# Patient Record
Sex: FEMALE | Race: WHITE | NOT HISPANIC OR LATINO | Employment: OTHER | URBAN - METROPOLITAN AREA
[De-identification: names, ages, dates, MRNs, and addresses within clinical notes are randomized per-mention and may not be internally consistent; named-entity substitution may affect disease eponyms.]

---

## 2021-08-30 ENCOUNTER — APPOINTMENT (OUTPATIENT)
Dept: CT IMAGING | Facility: CLINIC | Age: 86
End: 2021-08-30
Attending: EMERGENCY MEDICINE
Payer: COMMERCIAL

## 2021-08-30 ENCOUNTER — HOSPITAL ENCOUNTER (EMERGENCY)
Facility: CLINIC | Age: 86
Discharge: HOME | End: 2021-08-30
Attending: EMERGENCY MEDICINE | Admitting: EMERGENCY MEDICINE
Payer: COMMERCIAL

## 2021-08-30 ENCOUNTER — APPOINTMENT (OUTPATIENT)
Dept: RADIOLOGY | Facility: CLINIC | Age: 86
End: 2021-08-30
Attending: EMERGENCY MEDICINE
Payer: COMMERCIAL

## 2021-08-30 VITALS
RESPIRATION RATE: 28 BRPM | HEART RATE: 67 BPM | OXYGEN SATURATION: 97 % | BODY MASS INDEX: 29 KG/M2 | DIASTOLIC BLOOD PRESSURE: 77 MMHG | SYSTOLIC BLOOD PRESSURE: 177 MMHG | HEIGHT: 62 IN | WEIGHT: 157.6 LBS

## 2021-08-30 DIAGNOSIS — R07.89 ATYPICAL CHEST PAIN: ICD-10-CM

## 2021-08-30 DIAGNOSIS — M54.9 ACUTE MIDLINE BACK PAIN, UNSPECIFIED BACK LOCATION: ICD-10-CM

## 2021-08-30 DIAGNOSIS — I48.92 ATRIAL FLUTTER, UNSPECIFIED TYPE (H): ICD-10-CM

## 2021-08-30 LAB
ALBUMIN SERPL-MCNC: 3.5 G/DL (ref 3.5–5)
ALP SERPL-CCNC: 77 U/L (ref 45–120)
ALT SERPL W P-5'-P-CCNC: 11 U/L (ref 0–45)
ANION GAP SERPL CALCULATED.3IONS-SCNC: 10 MMOL/L (ref 5–18)
ANION GAP SERPL CALCULATED.3IONS-SCNC: 7 MMOL/L (ref 5–18)
AST SERPL W P-5'-P-CCNC: 13 U/L (ref 0–40)
BASOPHILS # BLD AUTO: 0.1 10E3/UL (ref 0–0.2)
BASOPHILS NFR BLD AUTO: 1 %
BILIRUB SERPL-MCNC: 0.3 MG/DL (ref 0–1)
BNP SERPL-MCNC: 27 PG/ML (ref 0–167)
BUN SERPL-MCNC: 14 MG/DL (ref 8–28)
BUN SERPL-MCNC: 14 MG/DL (ref 8–28)
CALCIUM SERPL-MCNC: 8.9 MG/DL (ref 8.5–10.5)
CALCIUM SERPL-MCNC: 9 MG/DL (ref 8.5–10.5)
CHLORIDE BLD-SCNC: 108 MMOL/L (ref 98–107)
CHLORIDE BLD-SCNC: 108 MMOL/L (ref 98–107)
CO2 SERPL-SCNC: 25 MMOL/L (ref 22–31)
CO2 SERPL-SCNC: 27 MMOL/L (ref 22–31)
CREAT SERPL-MCNC: 1 MG/DL (ref 0.6–1.1)
CREAT SERPL-MCNC: 1.05 MG/DL (ref 0.6–1.1)
EOSINOPHIL # BLD AUTO: 0.1 10E3/UL (ref 0–0.7)
EOSINOPHIL NFR BLD AUTO: 1 %
ERYTHROCYTE [DISTWIDTH] IN BLOOD BY AUTOMATED COUNT: 13.3 % (ref 10–15)
GFR SERPL CREATININE-BSD FRML MDRD: 46 ML/MIN/1.73M2
GFR SERPL CREATININE-BSD FRML MDRD: 48 ML/MIN/1.73M2
GLUCOSE BLD-MCNC: 96 MG/DL (ref 70–125)
GLUCOSE BLD-MCNC: 97 MG/DL (ref 70–125)
HCT VFR BLD AUTO: 38.2 % (ref 35–47)
HGB BLD-MCNC: 12 G/DL (ref 11.7–15.7)
HOLD SPECIMEN: NORMAL
HOLD SPECIMEN: NORMAL
IMM GRANULOCYTES # BLD: 0 10E3/UL
IMM GRANULOCYTES NFR BLD: 0 %
INR PPP: 1.01 (ref 0.85–1.15)
LYMPHOCYTES # BLD AUTO: 5.5 10E3/UL (ref 0.8–5.3)
LYMPHOCYTES NFR BLD AUTO: 54 %
MCH RBC QN AUTO: 30.8 PG (ref 26.5–33)
MCHC RBC AUTO-ENTMCNC: 31.4 G/DL (ref 31.5–36.5)
MCV RBC AUTO: 98 FL (ref 78–100)
MONOCYTES # BLD AUTO: 0.8 10E3/UL (ref 0–1.3)
MONOCYTES NFR BLD AUTO: 8 %
NEUTROPHILS # BLD AUTO: 3.6 10E3/UL (ref 1.6–8.3)
NEUTROPHILS NFR BLD AUTO: 36 %
NRBC # BLD AUTO: 0 10E3/UL
NRBC BLD AUTO-RTO: 0 /100
PLATELET # BLD AUTO: 197 10E3/UL (ref 150–450)
POTASSIUM BLD-SCNC: 4.3 MMOL/L (ref 3.5–5)
POTASSIUM BLD-SCNC: 4.4 MMOL/L (ref 3.5–5)
PROT SERPL-MCNC: 7.1 G/DL (ref 6–8)
RBC # BLD AUTO: 3.9 10E6/UL (ref 3.8–5.2)
SARS-COV-2 RNA RESP QL NAA+PROBE: NEGATIVE
SODIUM SERPL-SCNC: 142 MMOL/L (ref 136–145)
SODIUM SERPL-SCNC: 143 MMOL/L (ref 136–145)
TROPONIN I SERPL-MCNC: <0.01 NG/ML (ref 0–0.29)
TSH SERPL DL<=0.005 MIU/L-ACNC: 3.76 UIU/ML (ref 0.3–5)
WBC # BLD AUTO: 10.2 10E3/UL (ref 4–11)

## 2021-08-30 PROCEDURE — 99285 EMERGENCY DEPT VISIT HI MDM: CPT | Mod: 25

## 2021-08-30 PROCEDURE — C9803 HOPD COVID-19 SPEC COLLECT: HCPCS

## 2021-08-30 PROCEDURE — 84443 ASSAY THYROID STIM HORMONE: CPT | Performed by: EMERGENCY MEDICINE

## 2021-08-30 PROCEDURE — 82040 ASSAY OF SERUM ALBUMIN: CPT | Performed by: EMERGENCY MEDICINE

## 2021-08-30 PROCEDURE — 93005 ELECTROCARDIOGRAM TRACING: CPT | Performed by: EMERGENCY MEDICINE

## 2021-08-30 PROCEDURE — 85025 COMPLETE CBC W/AUTO DIFF WBC: CPT | Performed by: EMERGENCY MEDICINE

## 2021-08-30 PROCEDURE — 80048 BASIC METABOLIC PNL TOTAL CA: CPT | Performed by: EMERGENCY MEDICINE

## 2021-08-30 PROCEDURE — 71275 CT ANGIOGRAPHY CHEST: CPT

## 2021-08-30 PROCEDURE — 85610 PROTHROMBIN TIME: CPT | Performed by: EMERGENCY MEDICINE

## 2021-08-30 PROCEDURE — 250N000011 HC RX IP 250 OP 636: Performed by: EMERGENCY MEDICINE

## 2021-08-30 PROCEDURE — 87635 SARS-COV-2 COVID-19 AMP PRB: CPT | Performed by: EMERGENCY MEDICINE

## 2021-08-30 PROCEDURE — 36415 COLL VENOUS BLD VENIPUNCTURE: CPT | Performed by: EMERGENCY MEDICINE

## 2021-08-30 PROCEDURE — 83880 ASSAY OF NATRIURETIC PEPTIDE: CPT | Performed by: EMERGENCY MEDICINE

## 2021-08-30 PROCEDURE — 84484 ASSAY OF TROPONIN QUANT: CPT | Performed by: EMERGENCY MEDICINE

## 2021-08-30 PROCEDURE — 71046 X-RAY EXAM CHEST 2 VIEWS: CPT

## 2021-08-30 RX ORDER — IOPAMIDOL 755 MG/ML
100 INJECTION, SOLUTION INTRAVASCULAR ONCE
Status: COMPLETED | OUTPATIENT
Start: 2021-08-30 | End: 2021-08-30

## 2021-08-30 RX ORDER — LEVOTHYROXINE SODIUM 25 UG/1
25 TABLET ORAL
COMMUNITY
Start: 2020-09-09

## 2021-08-30 RX ADMIN — IOPAMIDOL 75 ML: 755 INJECTION, SOLUTION INTRAVENOUS at 21:01

## 2021-08-30 ASSESSMENT — ENCOUNTER SYMPTOMS
FEVER: 0
CHILLS: 0
ABDOMINAL PAIN: 0
SHORTNESS OF BREATH: 1
BACK PAIN: 1
COUGH: 0

## 2021-08-30 ASSESSMENT — MIFFLIN-ST. JEOR: SCORE: 1068.12

## 2021-08-30 NOTE — ED TRIAGE NOTES
"Atrial flutter in triage on monitor.    Short of breath and chest pain though to the back.  Has been going on for \"awhile\" she is coming in to department since her sxs aren't going away.    Brought right back tot he department, son at the bedside and triage was done after initial ekg done.  "

## 2021-08-30 NOTE — ED PROVIDER NOTES
EMERGENCY DEPARTMENT ENCOUNTER      NAME: Gena Nicole  AGE: 89 year old female  YOB: 1927  MRN: [id number]  EVALUATION DATE & TIME: 8/30/2021  5:31 PM    PCP: No primary care provider on file.    ED PROVIDER: JOEL Peoples    Chief Complaint   Patient presents with     Chest Pain     Shortness of Breath     FINAL IMPRESSION:  1. Acute midline back pain, unspecified back location    2. Atypical chest pain    3. Atrial flutter, unspecified type (H)      ED COURSE & MEDICAL DECISION MAKING:    Pertinent Labs & Imaging studies reviewed. (See chart for details)  89 year old female presents to the Emergency Department for evaluation of chest pain upper back pain.  Patient reporting the symptoms have been multiple days in duration although the history is not entirely clear in discussion with the patient in part due to hearing impairment and her age.  Son reporting that she is relatively healthy and active.  Seem to be doing pretty good 3 days ago when she went golfing with her son was able to do 14 holes.  And over the last couple of days been complaining about some chest and back pain.  Arrives to the emergency department declines any pain management.  Noted to be hypertensive.  Otherwise unremarkable vital signs.  ECG at arrival demonstrating atrial flutter with a controlled response.  Patient spontaneously converted to normal sinus rhythm and repeat ECG demonstrating sinus rhythm without ischemic changes.  Work-up in the emergency department was essentially unremarkable for acute source of patient's discomfort she had negative troponin.  ECG as mentioned did not appear to be ischemic on repeat.  Work-up otherwise unremarkable.  Did perform CTA imaging of the chest for further assessment.  Patient continued to decline any medication management for her symptoms.  Given the overall negative work-up I discussed her options with the patient and her son including additional testing to evaluate cardiac  further.  The patient and son at this point wanted to be discharged from the emergency department I think in light of the negative work-up for age it is reasonable to proceed with Tylenol for management of pain and close monitoring on outpatient basis.  I did refer the patient to cardiology given the episode of atrial flutter likely musculoskeletal in origin also recommended antacid therapy.  Seems unlikely to be ischemic given negative ECG negative troponin despite the length of time symptoms as well as atypical symptoms.  Reviewed return precautions prior to discharge.    5:49 PM I met with the patient, obtained an initial history, performed an examination and discussed the plan. PPE worn throughout all interactions with the patient, including surgical mask, gloves.     Plan and all results were discussed  Time was taken to answer all questions. Patient and/or associated parties expressed understanding and were agreeable to the treatment plan.  Warning signs and ER return precautions provided. Discharged with discharge instructions outlining plan for further care and follow up.     MEDICATIONS GIVEN IN THE EMERGENCY:  New Prescriptions    No medications on file       NEW PRESCRIPTIONS STARTED AT TODAY'S ER VISIT  Patient's Medications   New Prescriptions    No medications on file   Previous Medications    LEVOTHYROXINE (SYNTHROID/LEVOTHROID) 25 MCG TABLET    Take 25 mcg by mouth   Modified Medications    No medications on file   Discontinued Medications    No medications on file          =================================================================    HPI    Patient information was obtained from: Patient and her son    Use of Intrepreter: N/A     Gena Nicole is a 89 year old female with a pertinent history of Hashimoto's thyroiditis, who presents to this ED by private car with son for evaluation of chest pain and shortness of breath.     Patient reports onset of chest pain and shortness of breath that has  "been going on for \"a while.\" She is unable to recall when it started exactly, but notes it has been present for at least the past day. Patient notes her chest pain radiates to her back between her shoulder blades. Her son notes she had pneumonia a few months ago, she does not recall having this. Patient golfed with her son 3 days ago and did well, but stopped after the 14th hole because she had grown tired. She takes thyroid medication, no other medications and no changes to her dosing.  No known history of atrial fibrillation or flutter. Denies fever, chills, cough, abdominal pain, leg swelling, or any additional symptoms at this time.     REVIEW OF SYSTEMS   Review of Systems   Constitutional: Negative for chills and fever.   Respiratory: Positive for shortness of breath. Negative for cough.    Cardiovascular: Positive for chest pain. Negative for leg swelling.   Gastrointestinal: Negative for abdominal pain.   Musculoskeletal: Positive for back pain (in between shoulder blades).   All other systems reviewed and are negative.       PAST MEDICAL HISTORY:  Thyroid disorder    ALLERGIES:  Allergies   Allergen Reactions     Sulfa Drugs        FAMILY HISTORY:  History reviewed. No pertinent family history.    SOCIAL HISTORY:   Social History     Socioeconomic History     Marital status:      Spouse name: None     Number of children: None     Years of education: None     Highest education level: None   Occupational History     None   Tobacco Use     Smoking status: None   Substance and Sexual Activity     Alcohol use: None     Drug use: None     Sexual activity: None   Other Topics Concern     None   Social History Narrative     None     Social Determinants of Health     Financial Resource Strain:      Difficulty of Paying Living Expenses:    Food Insecurity:      Worried About Running Out of Food in the Last Year:      Ran Out of Food in the Last Year:    Transportation Needs:      Lack of Transportation " "(Medical):      Lack of Transportation (Non-Medical):    Physical Activity:      Days of Exercise per Week:      Minutes of Exercise per Session:    Stress:      Feeling of Stress :    Social Connections:      Frequency of Communication with Friends and Family:      Frequency of Social Gatherings with Friends and Family:      Attends Taoist Services:      Active Member of Clubs or Organizations:      Attends Club or Organization Meetings:      Marital Status:    Intimate Partner Violence:      Fear of Current or Ex-Partner:      Emotionally Abused:      Physically Abused:      Sexually Abused:        VITALS:  Patient Vitals for the past 24 hrs:   BP Pulse Resp SpO2 Height Weight   08/30/21 2130 (!) 165/74 66 24 97 % -- --   08/30/21 2115 (!) 196/82 75 (!) 36 98 % -- --   08/30/21 2100 -- 94 27 98 % -- --   08/30/21 2045 (!) 175/78 68 (!) 31 97 % -- --   08/30/21 2030 (!) 199/84 65 (!) 33 98 % -- --   08/30/21 2015 (!) 168/72 63 26 97 % -- --   08/30/21 2000 (!) 176/75 63 30 98 % -- --   08/30/21 1945 (!) 170/74 60 30 97 % -- --   08/30/21 1930 (!) 159/72 59 24 97 % -- --   08/30/21 1915 (!) 182/79 62 21 99 % -- --   08/30/21 1900 (!) 168/77 56 (!) 0 98 % -- --   08/30/21 1845 (!) 169/77 58 (!) 7 98 % -- --   08/30/21 1830 (!) 185/84 61 17 97 % -- --   08/30/21 1815 (!) 166/76 62 18 97 % -- --   08/30/21 1800 (!) 168/77 61 17 98 % -- --   08/30/21 1750 -- 64 19 98 % 1.575 m (5' 2\") 71.5 kg (157 lb 9.6 oz)   08/30/21 1745 (!) 174/75 63 17 97 % -- --   08/30/21 1740 -- 61 16 99 % -- --   08/30/21 1735 (!) 185/81 67 15 98 % -- --       PHYSICAL EXAM    Constitutional:  Well developed, Well nourished, NAD  HENT:  Normocephalic, Atraumatic, Bilateral external ears normal, Oropharynx moist Nose normal.   Neck: Normal range of motion   Eyes: Conjunctiva normal, No discharge.   Respiratory:  Normal breath sounds, No respiratory distress, No wheezing.  No cough.  Cardiovascular:  Normal heart rate, Normal rhythm. No murmur " appreciated.  Chest wall non-tender.  GI:  Soft, No tenderness, No masses, No flank tenderness.  No rebound or guarding.  : No CVA tenderness  Musculoskeletal: Full ROM.  No edema appreciated.  No cyanosis. Good ROM of major joints.  Integument:  Warm, Dry, No erythema, No rash.    Neurologic:  Alert & oriented.  No focal deficits appreciated.  Ambulatory.  Psychiatric:  Affect normal, Judgment normal, Mood normal.     LAB:  All pertinent labs reviewed and interpreted.  Results for orders placed or performed during the hospital encounter of 08/30/21   Chest XR,  PA & LAT    Impression    IMPRESSION: Cardiac silhouette size is within normal limits. Stable mild bilateral apical fibrotic changes. Mild pleural thickening lateral inferior aspect of the left hemithorax. 4 mm nodular opacity, lateral aspect of the left upper lung. Lungs are   otherwise clear. Hypertrophic changes visualized thoracic and upper lumbar spine. Mild hypertrophic changes, bilateral shoulders.   CT Chest Pulmonary Embolism w Contrast    Impression    IMPRESSION:  1.  No evidence for pulmonary emboli.  2.  No evidence for acute pulmonary disease.  3.  Diffuse enlargement of the thyroid gland.   Basic metabolic panel   Result Value Ref Range    Sodium 142 136 - 145 mmol/L    Potassium 4.3 3.5 - 5.0 mmol/L    Chloride 108 (H) 98 - 107 mmol/L    Carbon Dioxide (CO2) 27 22 - 31 mmol/L    Anion Gap 7 5 - 18 mmol/L    Urea Nitrogen 14 8 - 28 mg/dL    Creatinine 1.00 0.60 - 1.10 mg/dL    Calcium 8.9 8.5 - 10.5 mg/dL    Glucose 96 70 - 125 mg/dL    GFR Estimate 48 (L) >60 mL/min/1.73m2   Result Value Ref Range    Troponin I <0.01 0.00 - 0.29 ng/mL   CBC with platelets and differential   Result Value Ref Range    WBC Count 10.2 4.0 - 11.0 10e3/uL    RBC Count 3.90 3.80 - 5.20 10e6/uL    Hemoglobin 12.0 11.7 - 15.7 g/dL    Hematocrit 38.2 35.0 - 47.0 %    MCV 98 78 - 100 fL    MCH 30.8 26.5 - 33.0 pg    MCHC 31.4 (L) 31.5 - 36.5 g/dL    RDW 13.3 10.0 -  15.0 %    Platelet Count 197 150 - 450 10e3/uL    % Neutrophils 36 %    % Lymphocytes 54 %    % Monocytes 8 %    % Eosinophils 1 %    % Basophils 1 %    % Immature Granulocytes 0 %    NRBCs per 100 WBC 0 <1 /100    Absolute Neutrophils 3.6 1.6 - 8.3 10e3/uL    Absolute Lymphocytes 5.5 (H) 0.8 - 5.3 10e3/uL    Absolute Monocytes 0.8 0.0 - 1.3 10e3/uL    Absolute Eosinophils 0.1 0.0 - 0.7 10e3/uL    Absolute Basophils 0.1 0.0 - 0.2 10e3/uL    Absolute Immature Granulocytes 0.0 <=0.0 10e3/uL    Absolute NRBCs 0.0 10e3/uL   Extra Blue Top Tube   Result Value Ref Range    Hold Specimen JIC    Extra Red Top Tube   Result Value Ref Range    Hold Specimen JIC    Result Value Ref Range    INR 1.01 0.85 - 1.15   Comprehensive metabolic panel   Result Value Ref Range    Sodium 143 136 - 145 mmol/L    Potassium 4.4 3.5 - 5.0 mmol/L    Chloride 108 (H) 98 - 107 mmol/L    Carbon Dioxide (CO2) 25 22 - 31 mmol/L    Anion Gap 10 5 - 18 mmol/L    Urea Nitrogen 14 8 - 28 mg/dL    Creatinine 1.05 0.60 - 1.10 mg/dL    Calcium 9.0 8.5 - 10.5 mg/dL    Glucose 97 70 - 125 mg/dL    Alkaline Phosphatase 77 45 - 120 U/L    AST 13 0 - 40 U/L    ALT 11 0 - 45 U/L    Protein Total 7.1 6.0 - 8.0 g/dL    Albumin 3.5 3.5 - 5.0 g/dL    Bilirubin Total 0.3 0.0 - 1.0 mg/dL    GFR Estimate 46 (L) >60 mL/min/1.73m2   B-Type Natriuretic Peptide ([facility] Only)   Result Value Ref Range    BNP 27 0 - 167 pg/mL   TSH with free T4 reflex   Result Value Ref Range    TSH 3.76 0.30 - 5.00 uIU/mL   Asymptomatic COVID-19 Virus (Coronavirus) by PCR Nasopharyngeal    Specimen: Nasopharyngeal; Swab   Result Value Ref Range    SARS CoV2 PCR Negative Negative       RADIOLOGY:  Reviewed all pertinent imaging. Please see official radiology report.  CT Chest Pulmonary Embolism w Contrast   Final Result   IMPRESSION:   1.  No evidence for pulmonary emboli.   2.  No evidence for acute pulmonary disease.   3.  Diffuse enlargement of the thyroid gland.      Chest XR,  PA &  LAT   Final Result   IMPRESSION: Cardiac silhouette size is within normal limits. Stable mild bilateral apical fibrotic changes. Mild pleural thickening lateral inferior aspect of the left hemithorax. 4 mm nodular opacity, lateral aspect of the left upper lung. Lungs are    otherwise clear. Hypertrophic changes visualized thoracic and upper lumbar spine. Mild hypertrophic changes, bilateral shoulders.          EKG:    Performed at: 1726    Impression: Atrial flutter. Left axis deviation. Nonspecific ST and T wave abnormality. Abnormal ECG.    Rate: 64  Rhythm: Atrial flutter  Axis: -43  MN Interval: *  QRS Interval: 90  QTc Interval: 445  ST Changes: Nonspecific ST and T wave abnormality.  Comparison: No previous available.     Performed at: 17:49  Comparison: When compared with ECG of 17:26, sinus rhythm has replaced atrial flutter.     I have independently reviewed and interpreted the EKG(s) documented above.        I, Sarah Daniel, am serving as a scribe to document services personally performed by Dr. Peoples based on my observation and the provider's statements to me. I, Stas Peoples MD attest that Sarah Daniel is acting in a scribe capacity, has observed my performance of the services and has documented them in accordance with my direction.    Stas Peoples M.D.  Emergency Medicine  [facility] EMERGENCY ROOM  [address] [address] [phone]  Dept: [phone]     Stas Peoples MD  08/30/21 7413

## 2021-08-31 LAB
ATRIAL RATE - MUSE: 64 BPM
DIASTOLIC BLOOD PRESSURE - MUSE: 75 MMHG
INTERPRETATION ECG - MUSE: NORMAL
P AXIS - MUSE: 43 DEGREES
PR INTERVAL - MUSE: 190 MS
QRS DURATION - MUSE: 90 MS
QT - MUSE: 446 MS
QTC - MUSE: 460 MS
R AXIS - MUSE: -30 DEGREES
SYSTOLIC BLOOD PRESSURE - MUSE: 174 MMHG
T AXIS - MUSE: 1 DEGREES
VENTRICULAR RATE- MUSE: 64 BPM

## 2021-08-31 NOTE — DISCHARGE INSTRUCTIONS
When you arrived to the emergency department your heart was in an abnormal rhythm called atrial flutter.  You came out of this rhythm by herself.  This will require some follow-up with cardiology on an outpatient basis.  In addition given the symptoms you describe a referral has been placed to cardiology.  Please follow-up within the next 24 to 48 hours for additional assessment.  If you have escalation to your symptoms please return to the emergency department repeat assessment.  Take Tylenol for pain.

## 2021-09-02 ENCOUNTER — OFFICE VISIT (OUTPATIENT)
Dept: CARDIOLOGY | Facility: CLINIC | Age: 86
End: 2021-09-02
Attending: EMERGENCY MEDICINE
Payer: COMMERCIAL

## 2021-09-02 VITALS
HEART RATE: 64 BPM | SYSTOLIC BLOOD PRESSURE: 128 MMHG | DIASTOLIC BLOOD PRESSURE: 66 MMHG | BODY MASS INDEX: 27.11 KG/M2 | RESPIRATION RATE: 16 BRPM | WEIGHT: 148.2 LBS

## 2021-09-02 DIAGNOSIS — E03.9 HYPOTHYROIDISM, UNSPECIFIED TYPE: ICD-10-CM

## 2021-09-02 DIAGNOSIS — I48.92 ATRIAL FLUTTER, UNSPECIFIED TYPE (H): ICD-10-CM

## 2021-09-02 DIAGNOSIS — R07.9 CHEST PAIN, UNSPECIFIED TYPE: Primary | ICD-10-CM

## 2021-09-02 PROCEDURE — 99204 OFFICE O/P NEW MOD 45 MIN: CPT | Performed by: GENERAL ACUTE CARE HOSPITAL

## 2021-09-02 NOTE — PATIENT INSTRUCTIONS
1. We will schedule you to wear a heart monitor and to have an echocardiogram.  2. I recommend taking a low dose of aspirin (81mg) every day, and to consider taking a blood thinner, to reduce your risk of stroke.  3. See me back in 2 months.    Patient Education     Atrial Flutter    Atrial flutter means that your upper heart chambers (atria) are  beating very fast. It is caused by a problem in the electrical pathways of the heart muscle. It can be a sign of heart disease or other health problems that affect your heart.  Palpitations are the most common symptom of atrial flutter. This is the feeling that your heart is fluttering or beating fast or hard. When your heart beats too fast, it doesn t pump blood very well. This can cause other symptoms. These include:    Anxiety    Tiredness (fatigue)    Shortness of breath    Chest pain    Dizziness    Fainting  Some people have no symptoms while they are in atrial flutter.  If this is the first time you ve had atrial flutter, and you don t have heart or lung disease, it may never happen again. But in most cases, atrial flutter comes and goes. It can last from a few hours to a couple of days. Sometimes the atrial flutter doesn t ever go away. This is chronic atrial flutter.  Atrial flutter may be caused by heart disease. It may also be caused by other conditions that affect the heart:    Coronary artery disease (arteriosclerosis). This is also sometimes called blocked arteries.    High blood pressure    Disease of the heart valves    Enlarged heart    Heart failure  Atrial flutter can occur without heart disease. This may be because of:    Overactive thyroid (hyperthyroid)    Chronic lung disease (COPD, emphysema, or bronchitis)    Alcohol use    Drugs or medicines that stimulate the heart. These include cocaine, amphetamines, diet pills, some decongestant cold medicines, caffeine, and nicotine.    Infection    Obesity    Sleep apnea  Treating or removing these causes  will make it more likely that treatment for atrial flutter will work. It will also make it less likely that atrial flutter will come back.  Atrial flutter can happen along with another heart rhythm problem called atrial fibrillation. The risk for stroke is higher with these conditions. Getting treatment can reduce your risk.  Home care  Follow these guidelines when caring for yourself at home:    Go back to your usual activities as soon as you feel back to normal.    If you smoke, stop smoking. Talk with your healthcare provider or call a local stop-smoking program for help.    Don t take drugs or medicines that stimulate your heart. These include cocaine, amphetamines, diet pills, some decongestant cold medicines, caffeine, and nicotine.    Your provider may have prescribed medicine to stop atrial flutter from coming back. Take this medicine exactly as directed. Some medicines must be taken every day to work as they should. They aren't taken just when you have symptoms.    Your provider may also have prescribed a blood-thinning medicine called warfarin. This lowers your risk for stroke. Have your blood tested regularly as advised by your healthcare provider. This will help make sure the dose is right for you. Other blood thinning medicines don't need regular testing.  Follow-up care  Follow up with your healthcare provider, or as advised.  When to seek medical advice  Call your healthcare provider right away if any of these occur:    Swelling in either leg    Unexpected weight gain     Call 911  This is the fastest and safest way to get to the emergency department. The paramedics can also start treatment on the way to the hospital, if needed.  Call 911, or seek immediate medical attention, if any of the following occur:    Shortness of breath gets worse    Feeling lightheaded, faint, or dizzy    Bleeding that is not easily controlled    A heartbeat that is very rapid, slow, or irregular compared with your normal  heartbeat    Chest pain or pressure    Extreme drowsiness or confusion    Weakness of an arm or leg or one side of the face    Trouble speaking or seeing  [employer] last reviewed this educational content on 11/1/2019 2000-2021 [employer]. All rights reserved. This information is not intended as a substitute for professional medical care. Always follow your healthcare professional's instructions.

## 2021-09-02 NOTE — LETTER
9/2/2021    Physician No Ref-Primary  No address on file    RE: Gena Nicole       Dear Colleague,    I had the pleasure of seeing Gena Nicole in the [facility].        Thank you, Dr. Stas Sanchez, for asking the [facility] team to see Ms. Gena Nicole to evaluate new-onset atrial flutter.    Assessment/Recommendations   Assessment:    1. New-onset atrial flutter versus coarse atrial fibrillation. Review of the electrocardiogram from the emergency department visit on 8/30/2021 is read by the computer as showing atrial flutter but this appears to be due to baseline artifact as there is evidence of sinus rhythm in leads I and aVL. No other clear documentation of an atrial arrhythmia. [id number] score is at least 3.  2. Chest pain. Atypical, likely musculoskeletal.  3. Hypothyroidism.    Plan:  1. 14-day event monitor.  2. Transthoracic echocardiogram.  3. She can take aspirin 81 mg daily for now.  4. The possibility of anticoagulation was discussed and she was not interested in this.  5. If her echocardiogram shows no major abnormalities and she has no evidence of atrial fibrillation/flutter on her monitor, aspirin can be discontinued.  6. Follow-up in 2 months.         History of Present Illness   Ms. Gena Nicole is a 89 year old female with a significant past history of hypothyroidism presenting for urgent evaluation of newly-diagnosed atrial flutter. She was seen in the ED on 8/30/2021 for this. She was having aching pain in her chest and back. Troponin and BNP were normal. ECG showed rate-controlled atrial flutter which spontaneously converted to sinus rhythm while in the ED. CTA chest was performed, which was negative for acute pathology.    Her chest and back pain feels better. She still likes to play golf. She denies any chest pain/pressure/tightness, shortness of breath at rest or with exertion, light  headedness/dizziness, pre-syncope, syncope, lower extremity swelling, palpitations, paroxysmal nocturnal dyspnea (PND), or orthopnea.     Cardiac Problems and Cardiac Diagnostics     Most Recent Cardiac testing:  ECG dated 8/30/2021 (personaly reviewed and interpreted): read as atrial flutter versus coarse atrial fibrillation but this appears to be due to baseline noise, as there are clear P waves in leads V1 and I    CTA chest 8/30/2021 (report reviewed):   1.  No evidence for pulmonary emboli.  2.  No evidence for acute pulmonary disease.  3.  Diffuse enlargement of the thyroid gland.  4.  Mild coronary artery calcifications.       Medications  Allergies   Current Outpatient Medications   Medication Sig Dispense Refill     levothyroxine (SYNTHROID/LEVOTHROID) 25 MCG tablet Take 25 mcg by mouth        Allergies   Allergen Reactions     Sulfa Drugs         Physical Examination Review of Systems   /66 (BP Location: Right arm, Patient Position: Sitting, Cuff Size: Adult Regular)   Pulse 64   Resp 16   Wt 67.2 kg (148 lb 3.2 oz)   BMI 27.11 kg/m    Body mass index is 27.11 kg/m .  Wt Readings from Last 3 Encounters:   09/02/21 67.2 kg (148 lb 3.2 oz)   08/30/21 71.5 kg (157 lb 9.6 oz)       General Appearance:   Pleasant  female, appears  stated age. no acute distress, normal body habitus   ENT/Mouth: membranes moist, no apparent gingival bleeding.      EYES:  no scleral icterus, normal conjunctivae   Neck: no carotid bruits. No anterior cervical lymphadenopaty   Respiratory:   lungs are clear to auscultation, no rales or wheezing,  equal chest wall expansion    Cardiovascular:   Regular rhythm, normal rate. Normal first and second heart sounds with no murmurs, rubs, or gallops; the carotid, radial and posterior tibial pulses are intact, Jugular venous pressure normal, no edema bilaterally    Abdomen/GI:  no organomegaly, masses, bruits, or tenderness; bowel sounds are present   Extremities: no cyanosis or  clubbing   Skin: no xanthelasma, warm.    Heme/lymph/ Immunology No apparent bleeding noted.   Neurologic: Alert and oriented. normal gait, no tremors     Psychiatric: Pleasant, calm, appropriate affect.    A complete 10 system review of systems was performed and is negative except as mentioned in the HPI/subjective.         Past History   Past Medical History:   1. Hypothyroidism    Past Surgical History:   1. Removal of a thyroid nodule at age 35.  2. Cholecystectomy.    Family History:   No family history of heart disease.    Social History:   Social History     Socioeconomic History     Marital status:      Spouse name: Not on file     Number of children: Not on file     Years of education: Not on file     Highest education level: Not on file   Occupational History     Not on file   Tobacco Use     Smoking status: Never Smoker     Smokeless tobacco: Never Used   Substance and Sexual Activity     Alcohol use: Not on file     Drug use: Never     Sexual activity: Not on file   Other Topics Concern     Not on file   Social History Narrative     Not on file     Social Determinants of Health     Financial Resource Strain:      Difficulty of Paying Living Expenses:    Food Insecurity:      Worried About Running Out of Food in the Last Year:      Ran Out of Food in the Last Year:    Transportation Needs:      Lack of Transportation (Medical):      Lack of Transportation (Non-Medical):    Physical Activity:      Days of Exercise per Week:      Minutes of Exercise per Session:    Stress:      Feeling of Stress :    Social Connections:      Frequency of Communication with Friends and Family:      Frequency of Social Gatherings with Friends and Family:      Attends Buddhism Services:      Active Member of Clubs or Organizations:      Attends Club or Organization Meetings:      Marital Status:    Intimate Partner Violence:      Fear of Current or Ex-Partner:      Emotionally Abused:      Physically Abused:       Sexually Abused:               Lab Results    Chemistry/lipid CBC Cardiac Enzymes/BNP/TSH/INR   Lab Results   Component Value Date    BUN 14 08/30/2021    BUN 14 08/30/2021     08/30/2021     08/30/2021    CO2 27 08/30/2021    CO2 25 08/30/2021     Lab Results   Component Value Date    CR 1.00 08/30/2021    CR 1.05 08/30/2021     Lab Results   Component Value Date    POTASSIUM 4.3 08/30/2021    POTASSIUM 4.4 08/30/2021    Lab Results   Component Value Date    WBC 10.2 08/30/2021    HGB 12.0 08/30/2021    HCT 38.2 08/30/2021    MCV 98 08/30/2021     08/30/2021    Lab Results   Component Value Date    TROPONINI <0.01 08/30/2021    BNP 27 08/30/2021    TSH 3.76 08/30/2021    INR 1.01 08/30/2021          Gustavo Lizama MD [facility]  Non-Invasive Cardiologist  [facility]  Pager [phone]        Thank you for allowing me to participate in the care of your patient.      Sincerely,     Gustavo Lizama MD     [facility]  cc:   Stas Peoples MD  [address] [place] [address]

## 2021-09-02 NOTE — PROGRESS NOTES
Thank you, Dr. Stas Sanchez, for asking the [facility] team to see Ms. Gena Nicole to evaluate new-onset atrial flutter.    Assessment/Recommendations   Assessment:    1. New-onset atrial flutter versus coarse atrial fibrillation. Review of the electrocardiogram from the emergency department visit on 8/30/2021 is read by the computer as showing atrial flutter but this appears to be due to baseline artifact as there is evidence of sinus rhythm in leads I and aVL. No other clear documentation of an atrial arrhythmia. [id number] score is at least 3.  2. Chest pain. Atypical, likely musculoskeletal.  3. Hypothyroidism.    Plan:  1. 14-day event monitor.  2. Transthoracic echocardiogram.  3. She can take aspirin 81 mg daily for now.  4. The possibility of anticoagulation was discussed and she was not interested in this.  5. If her echocardiogram shows no major abnormalities and she has no evidence of atrial fibrillation/flutter on her monitor, aspirin can be discontinued.  6. Follow-up in 2 months.         History of Present Illness   Ms. Gena Nicole is a 89 year old female with a significant past history of hypothyroidism presenting for urgent evaluation of newly-diagnosed atrial flutter. She was seen in the ED on 8/30/2021 for this. She was having aching pain in her chest and back. Troponin and BNP were normal. ECG showed rate-controlled atrial flutter which spontaneously converted to sinus rhythm while in the ED. CTA chest was performed, which was negative for acute pathology.    Her chest and back pain feels better. She still likes to play golf. She denies any chest pain/pressure/tightness, shortness of breath at rest or with exertion, light headedness/dizziness, pre-syncope, syncope, lower extremity swelling, palpitations, paroxysmal nocturnal dyspnea (PND), or orthopnea.     Cardiac Problems and Cardiac Diagnostics     Most Recent Cardiac testing:  ECG dated 8/30/2021 (personaly  reviewed and interpreted): read as atrial flutter versus coarse atrial fibrillation but this appears to be due to baseline noise, as there are clear P waves in leads V1 and I    CTA chest 8/30/2021 (report reviewed):   1.  No evidence for pulmonary emboli.  2.  No evidence for acute pulmonary disease.  3.  Diffuse enlargement of the thyroid gland.  4.  Mild coronary artery calcifications.       Medications  Allergies   Current Outpatient Medications   Medication Sig Dispense Refill     levothyroxine (SYNTHROID/LEVOTHROID) 25 MCG tablet Take 25 mcg by mouth        Allergies   Allergen Reactions     Sulfa Drugs         Physical Examination Review of Systems   /66 (BP Location: Right arm, Patient Position: Sitting, Cuff Size: Adult Regular)   Pulse 64   Resp 16   Wt 67.2 kg (148 lb 3.2 oz)   BMI 27.11 kg/m    Body mass index is 27.11 kg/m .  Wt Readings from Last 3 Encounters:   09/02/21 67.2 kg (148 lb 3.2 oz)   08/30/21 71.5 kg (157 lb 9.6 oz)       General Appearance:   Pleasant  female, appears  stated age. no acute distress, normal body habitus   ENT/Mouth: membranes moist, no apparent gingival bleeding.      EYES:  no scleral icterus, normal conjunctivae   Neck: no carotid bruits. No anterior cervical lymphadenopaty   Respiratory:   lungs are clear to auscultation, no rales or wheezing,  equal chest wall expansion    Cardiovascular:   Regular rhythm, normal rate. Normal first and second heart sounds with no murmurs, rubs, or gallops; the carotid, radial and posterior tibial pulses are intact, Jugular venous pressure normal, no edema bilaterally    Abdomen/GI:  no organomegaly, masses, bruits, or tenderness; bowel sounds are present   Extremities: no cyanosis or clubbing   Skin: no xanthelasma, warm.    Heme/lymph/ Immunology No apparent bleeding noted.   Neurologic: Alert and oriented. normal gait, no tremors     Psychiatric: Pleasant, calm, appropriate affect.    A complete 10 system review of systems  was performed and is negative except as mentioned in the HPI/subjective.         Past History   Past Medical History:   1. Hypothyroidism    Past Surgical History:   1. Removal of a thyroid nodule at age 35.  2. Cholecystectomy.    Family History:   No family history of heart disease.    Social History:   Social History     Socioeconomic History     Marital status:      Spouse name: Not on file     Number of children: Not on file     Years of education: Not on file     Highest education level: Not on file   Occupational History     Not on file   Tobacco Use     Smoking status: Never Smoker     Smokeless tobacco: Never Used   Substance and Sexual Activity     Alcohol use: Not on file     Drug use: Never     Sexual activity: Not on file   Other Topics Concern     Not on file   Social History Narrative     Not on file     Social Determinants of Health     Financial Resource Strain:      Difficulty of Paying Living Expenses:    Food Insecurity:      Worried About Running Out of Food in the Last Year:      Ran Out of Food in the Last Year:    Transportation Needs:      Lack of Transportation (Medical):      Lack of Transportation (Non-Medical):    Physical Activity:      Days of Exercise per Week:      Minutes of Exercise per Session:    Stress:      Feeling of Stress :    Social Connections:      Frequency of Communication with Friends and Family:      Frequency of Social Gatherings with Friends and Family:      Attends Yazidism Services:      Active Member of Clubs or Organizations:      Attends Club or Organization Meetings:      Marital Status:    Intimate Partner Violence:      Fear of Current or Ex-Partner:      Emotionally Abused:      Physically Abused:      Sexually Abused:               Lab Results    Chemistry/lipid CBC Cardiac Enzymes/BNP/TSH/INR   Lab Results   Component Value Date    BUN 14 08/30/2021    BUN 14 08/30/2021     08/30/2021     08/30/2021    CO2 27 08/30/2021    CO2 25  08/30/2021     Lab Results   Component Value Date    CR 1.00 08/30/2021    CR 1.05 08/30/2021     Lab Results   Component Value Date    POTASSIUM 4.3 08/30/2021    POTASSIUM 4.4 08/30/2021    Lab Results   Component Value Date    WBC 10.2 08/30/2021    HGB 12.0 08/30/2021    HCT 38.2 08/30/2021    MCV 98 08/30/2021     08/30/2021    Lab Results   Component Value Date    TROPONINI <0.01 08/30/2021    BNP 27 08/30/2021    TSH 3.76 08/30/2021    INR 1.01 08/30/2021          Gustavo Lizama MD [facility]  Non-Invasive Cardiologist  [facility]  Pager [phone]

## 2021-10-04 ENCOUNTER — HOSPITAL ENCOUNTER (OUTPATIENT)
Dept: CARDIOLOGY | Facility: CLINIC | Age: 86
End: 2021-10-04
Attending: GENERAL ACUTE CARE HOSPITAL
Payer: COMMERCIAL

## 2021-10-04 DIAGNOSIS — I48.92 ATRIAL FLUTTER, UNSPECIFIED TYPE (H): ICD-10-CM

## 2021-10-04 LAB — LVEF ECHO: NORMAL

## 2021-10-04 PROCEDURE — 93306 TTE W/DOPPLER COMPLETE: CPT

## 2021-10-04 PROCEDURE — 93306 TTE W/DOPPLER COMPLETE: CPT | Mod: 26 | Performed by: INTERNAL MEDICINE

## 2021-10-04 PROCEDURE — 93272 ECG/REVIEW INTERPRET ONLY: CPT | Performed by: INTERNAL MEDICINE

## 2021-10-04 PROCEDURE — 93270 REMOTE 30 DAY ECG REV/REPORT: CPT

## 2021-10-20 ENCOUNTER — TELEPHONE (OUTPATIENT)
Dept: CARDIOLOGY | Facility: CLINIC | Age: 86
End: 2021-10-20

## 2021-10-20 NOTE — CONFIDENTIAL NOTE
Left detailed message with results, recommendations and my direct number to call and let me know if she would like to keep her upcoming appointment 11/11 or cancel it.

## 2021-10-20 NOTE — TELEPHONE ENCOUNTER
----- Message from Gustavo Lizama MD sent at 10/19/2021  4:05 PM CDT -----  Ramirez Washington,    Can you let Gena know that her TTE and heart monitor look normal? It appears she has no evidence of atrial fibrillation or other heart disease. She does not need to take aspirin any longer. If she continues to feel well, she does not have to follow-up with me in clinic but if she would still like to follow-up in person and discuss how she is doing, we can certainly keep the appointment.    Thanks,  Gustavo

## 2021-10-28 NOTE — TELEPHONE ENCOUNTER
Reached out to patient to assure she had received the message. Patient did receive the message, stopped aspirin and states she does not need the appointment on 11/11.  Encouraged patient to call with any further questions or concerns. Understanding verbalized. Message sent to scheduling to cancel her upcoming appointment.